# Patient Record
Sex: MALE | Race: WHITE | NOT HISPANIC OR LATINO | URBAN - METROPOLITAN AREA
[De-identification: names, ages, dates, MRNs, and addresses within clinical notes are randomized per-mention and may not be internally consistent; named-entity substitution may affect disease eponyms.]

---

## 2023-05-31 ENCOUNTER — OFFICE (OUTPATIENT)
Dept: URBAN - METROPOLITAN AREA CLINIC 13 | Facility: CLINIC | Age: 43
End: 2023-05-31
Payer: COMMERCIAL

## 2023-05-31 VITALS
HEART RATE: 105 BPM | OXYGEN SATURATION: 98 % | SYSTOLIC BLOOD PRESSURE: 110 MMHG | DIASTOLIC BLOOD PRESSURE: 70 MMHG | HEIGHT: 65 IN | WEIGHT: 134 LBS

## 2023-05-31 DIAGNOSIS — R19.7 DIARRHEA, UNSPECIFIED: ICD-10-CM

## 2023-05-31 PROCEDURE — 99244 OFF/OP CNSLTJ NEW/EST MOD 40: CPT | Performed by: INTERNAL MEDICINE
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
## 2023-05-31 NOTE — SERVICENOTES
Try taking 1 tablespoon Of Metamucil dailyAvoid milk products.We discussed the need to avoid diarrhea provoking foods such as milk and ice cream, that is lactose, as well as high fructose corn syrup such as regular soda and sweet tea.  We also discussed the need to avoid high fat food items as well as high amounts of caffeine and highly spiced items which sometimes contribute to diarrhea.

## 2023-06-21 LAB
C-REACTIVE PROTEIN: <0.3 MG/DL
CBC  AND  PLATELET COUNT: HEMATOCRIT: 44.2 %
CBC  AND  PLATELET COUNT: HEMOGLOBIN: 14.5 G/DL
CBC  AND  PLATELET COUNT: MCH: 29.2 PG
CBC  AND  PLATELET COUNT: MCHC: 32.8 G/DL
CBC  AND  PLATELET COUNT: MCV: 88.9 FL
CBC  AND  PLATELET COUNT: MPV: 11 FL
CBC  AND  PLATELET COUNT: PLATELET COUNT: 249 K/UL
CBC  AND  PLATELET COUNT: RBC: 4.97 M/UL
CBC  AND  PLATELET COUNT: RDW: 13.1 %
CBC  AND  PLATELET COUNT: WBC COUNT: 6.9 K/UL
COMPREHENSIVE METABOLIC PANEL: A/G RATIO: 2.5 RATIO
COMPREHENSIVE METABOLIC PANEL: ALBUMIN: 5 G/DL
COMPREHENSIVE METABOLIC PANEL: ALK PHOSPHATASE: 89 U/L
COMPREHENSIVE METABOLIC PANEL: ALT: 14 U/L
COMPREHENSIVE METABOLIC PANEL: AST: 12 U/L
COMPREHENSIVE METABOLIC PANEL: BILIRUBIN,TOTAL: 1 MG/DL
COMPREHENSIVE METABOLIC PANEL: BLOOD UREA NITROGEN: 8 MG/DL
COMPREHENSIVE METABOLIC PANEL: BUN/CREAT RATIO: 10
COMPREHENSIVE METABOLIC PANEL: CALCIUM: 10.2 MG/DL
COMPREHENSIVE METABOLIC PANEL: CHLORIDE: 96 MEQ/L
COMPREHENSIVE METABOLIC PANEL: CO2: 22 MEQ/L
COMPREHENSIVE METABOLIC PANEL: CREATININE: 0.8 MG/DL
COMPREHENSIVE METABOLIC PANEL: FASTING STATUS: (no result)
COMPREHENSIVE METABOLIC PANEL: GLOBULIN: 2 G/DL
COMPREHENSIVE METABOLIC PANEL: GLOMERULAR FILTRATION RATE (GFR): 114 MLS/MIN/1.73M2
COMPREHENSIVE METABOLIC PANEL: GLUCOSE,RANDOM: 244 MG/DL — HIGH
COMPREHENSIVE METABOLIC PANEL: POTASSIUM: 4.8 MEQ/L
COMPREHENSIVE METABOLIC PANEL: SODIUM: 136 MEQ/L
COMPREHENSIVE METABOLIC PANEL: TOTAL PROTEIN: 7 G/DL

## 2023-07-26 ENCOUNTER — OFFICE (OUTPATIENT)
Dept: URBAN - METROPOLITAN AREA CLINIC 13 | Facility: CLINIC | Age: 43
End: 2023-07-26
Payer: COMMERCIAL

## 2023-07-26 VITALS
WEIGHT: 130 LBS | SYSTOLIC BLOOD PRESSURE: 116 MMHG | HEART RATE: 108 BPM | HEIGHT: 65 IN | DIASTOLIC BLOOD PRESSURE: 70 MMHG

## 2023-07-26 DIAGNOSIS — R19.7 DIARRHEA, UNSPECIFIED: ICD-10-CM

## 2023-07-26 PROCEDURE — 99212 OFFICE O/P EST SF 10 MIN: CPT | Mod: SA | Performed by: NURSE PRACTITIONER

## 2023-09-07 ENCOUNTER — OFFICE (OUTPATIENT)
Dept: URBAN - METROPOLITAN AREA CLINIC 13 | Facility: CLINIC | Age: 43
End: 2023-09-07
Payer: COMMERCIAL

## 2023-09-07 VITALS
DIASTOLIC BLOOD PRESSURE: 80 MMHG | SYSTOLIC BLOOD PRESSURE: 110 MMHG | HEIGHT: 65 IN | HEART RATE: 118 BPM | OXYGEN SATURATION: 99 % | WEIGHT: 132 LBS

## 2023-09-07 DIAGNOSIS — R19.7 DIARRHEA, UNSPECIFIED: ICD-10-CM

## 2023-09-07 PROCEDURE — 99212 OFFICE O/P EST SF 10 MIN: CPT | Mod: SA | Performed by: NURSE PRACTITIONER
